# Patient Record
Sex: MALE | URBAN - METROPOLITAN AREA
[De-identification: names, ages, dates, MRNs, and addresses within clinical notes are randomized per-mention and may not be internally consistent; named-entity substitution may affect disease eponyms.]

---

## 2017-01-02 ENCOUNTER — TELEPHONE (OUTPATIENT)
Dept: FAMILY MEDICINE | Facility: CLINIC | Age: 27
End: 2017-01-02

## 2017-01-02 NOTE — TELEPHONE ENCOUNTER
Spoke to pt and scheduled him for Thursday with JF per his request.  Currently out of town and did not want OV with another provider tomorrow or Wednesday.  Saloni Campbell RN

## 2017-01-02 NOTE — TELEPHONE ENCOUNTER
Reason for call:  Patient reporting a symptom    Symptom or request: Strep Throat 11/29 went to [facility] and was given ABX and 4 days after he finished the Abx   he started getting symptoms again and went in around 12/15 and had strep again  The patient was then put on another ABX after he finishde this Antibiotic then the patients throat started hurting again he did a Zip-nosis  And the test was negative. The patient said both other time it came on hard and fast but this time it was not so hard but his tonsils are swollen   Like the size of Golf balls  And the [facility] told him to follow up with PCP clinic      Duration (how long have symptoms been present): for a month    Have you been treated for this before? Yes    Additional comments: please call the patient to discuss what he should do next    Phone Number patient can be reached at:  Home number on file [phone] (home)    Best Time:   anytime    Can we leave a detailed message on this number:  YES    Call taken on 1/2/2017 at 11:58 AM by Olamide Simmons

## 2017-03-17 ENCOUNTER — VIRTUAL VISIT (OUTPATIENT)
Dept: FAMILY MEDICINE | Facility: OTHER | Age: 27
End: 2017-03-17

## 2017-03-17 NOTE — PROGRESS NOTES
Date:   Clinician: Aron Rosen  Clinician NPI: [id number]  Patient: Jordan Tovar  Patient : 1990  Patient Address: [address] [place] [id number]  Patient Phone: [phone]  Visit Protocol: URI  Patient Summary:  Jordan is a 26 year old ( : 1990 ) male who initiated a Zip for evaluation of sore throat.      His symptoms started gradually 3-6 days ago and consist of ear pain, malaise, fever, loss of appetite, dysphagia, rhinitis, post-nasal drainage, sore throat, and hoarse voice.   He denies myalgias, chest pain, facial pain or pressure, cough, itchy eyes, chills, nausea, vomiting, dyspnea, nasal congestion, headache, and petechial or purpuric rash. He denies a history of facial surgery.   His minimal nasal secretions are clear.   He has a moderately painful sore throat. When Will swallows liquids or saliva, he experiences severe pain. The patient denies having white spots on the tonsils similar to a sample strep throat image provided. He might have been exposed to Strep. When asked to feel his neck he reported enlarged lymph nodes. Will noted that the enlarged neck lymph nodes developed AFTER the onset of upper respiratory symptoms. He cannot tell if axillary lymphadenopathy is present.   Regarding the ear pain, the patient denies experiencing pain when gently pulling on the earlobe, pain if the mouth is fully open or teeth are clenched, recent injury to the area around the ear, and tinnitus.   He reports having mild ear pain on the ear canal area of both ears for 2-4 days. The patient hears normally despite the ear pain.   In addition to the ear pain, Jordan also reports having the following symptoms:    A feeling of fullness in the ear as if it is clogged   Will denies having swelling, tenderness, and redness on his ear.   Additionally, he.   He has never had tympanostomy tube placement. In the last two weeks the patient has participated in swimming.    His highest  temperature was 99.7 degrees Fahrenheit. His current temperature is 98.4 degrees Fahrenheit. Jordan has had a fever for 1 - 2 days and used the oral method for measuring his temperature.   He has passed urine in the past 12 hours.   Jordan denies having COPD or other chronic lung disease.   Pulse: self-reported pulse rate as: 11 beats in 10 seconds.   Current Temperature (F): 98.4     Weight (in lbs): 165   Jordan does not smoke or use smokeless tobacco.   Additional information as reported by the patient (free text): My tonsils are huge.   MEDICATIONS:  Sertraline (Zoloft)  , ALLERGIES:  NKDA   Clinician Response:  Dear Jordan,  Your ZipTicket results show that you have a bacterial infection called strep throat. I am prescribing Penicillin 500mg to treat this infection. Take one tablet twice a day for ten days, there is no refill with this prescription. Be sure to take all of the medication exactly as prescribed.   You will need to obtain a new toothbrush after taking your antibiotics for 24 hours. Do not return to work, school, or  until you have taken the antibiotic medication for a full 24 hours. Do not share eating utensils and avoid direct contact with others, such as kissing, until the infection has resolved.  Try the following to help with your pain and discomfort:     Use throat lozenges    Gargle with warm salt water (1/4 teaspoon of salt per 8 ounce glass of water).    Suck on frozen items such as Popsicles or ice cubes.    Take ibuprofen (such as Advil or store brand) or acetaminophen (Tylenol or store brand) for discomfort or fever     Call 911 or go to the emergency room if you suddenly develop a rash, are drooling or unable to swallow fluids, if you are having difficulty breathing, or feel that your throat is closing off.  Call your clinic right away if you have blood in the urine, increased blood pressure or swelling in any part of the body. These are signs of a rare but serious problem called  post-streptococcal glomerulonephritis, which can happen after a strep infection.  Follow up with your primary care clinician if your symptoms are not improving in 3-4 days.   Diagnosis: ZIP TICKET STREP  Diagnosis ICD: J02.0  ZipTicket Results: Strep Test - Positive: group a streptococcal antigen detected by immunoassay  Chrisket Secondary Results: N/A  Prescription: penicillin V potassium 500mg 20 tablets, 10 days supply. Take one tablet by mouth two times a day for 10 days. Refills: 0, Refill as needed: no, Allow substitutions: yes

## 2018-03-08 ENCOUNTER — OFFICE VISIT (OUTPATIENT)
Dept: FAMILY MEDICINE | Facility: CLINIC | Age: 28
End: 2018-03-08
Payer: COMMERCIAL

## 2018-03-08 VITALS
TEMPERATURE: 97.3 F | OXYGEN SATURATION: 99 % | RESPIRATION RATE: 16 BRPM | HEART RATE: 65 BPM | HEIGHT: 70 IN | WEIGHT: 182.2 LBS | BODY MASS INDEX: 26.08 KG/M2 | SYSTOLIC BLOOD PRESSURE: 100 MMHG | DIASTOLIC BLOOD PRESSURE: 58 MMHG

## 2018-03-08 DIAGNOSIS — Z00.00 ROUTINE HISTORY AND PHYSICAL EXAMINATION OF ADULT: Primary | ICD-10-CM

## 2018-03-08 DIAGNOSIS — B07.0 PLANTAR WARTS: ICD-10-CM

## 2018-03-08 DIAGNOSIS — F52.4 PREMATURE EJACULATION: ICD-10-CM

## 2018-03-08 PROCEDURE — 99395 PREV VISIT EST AGE 18-39: CPT | Mod: 25 | Performed by: FAMILY MEDICINE

## 2018-03-08 PROCEDURE — 17110 DESTRUCTION B9 LES UP TO 14: CPT | Performed by: FAMILY MEDICINE

## 2018-03-08 RX ORDER — SERTRALINE HYDROCHLORIDE 100 MG/1
100 TABLET, FILM COATED ORAL DAILY
Qty: 90 TABLET | Refills: 3 | Status: SHIPPED | OUTPATIENT
Start: 2018-03-08 | End: 2019-03-14

## 2018-03-08 RX ORDER — SERTRALINE HYDROCHLORIDE 100 MG/1
100 TABLET, FILM COATED ORAL DAILY
Qty: 90 TABLET | Refills: 3 | Status: SHIPPED | OUTPATIENT
Start: 2018-03-08 | End: 2018-03-08

## 2018-03-08 NOTE — NURSING NOTE
"Chief Complaint   Patient presents with     Physical     Initial /58  Pulse 65  Temp 97.3  F (36.3  C) (Oral)  Resp 16  Ht 5' 10.1\" (1.781 m)  Wt 182 lb 3.2 oz (82.6 kg)  SpO2 99%  BMI 26.07 kg/m2 Estimated body mass index is 26.07 kg/(m^2) as calculated from the following:    Height as of this encounter: 5' 10.1\" (1.781 m).    Weight as of this encounter: 182 lb 3.2 oz (82.6 kg).  BP completed using cuff size: large.L  arm       Health Maintenance that is potentially due pending provider review:   NONE           Melony Le CMA     "

## 2018-03-08 NOTE — MR AVS SNAPSHOT
After Visit Summary   3/8/2018    Grady Tovar    MRN: [id number]           Patient Information     Date Of Birth          1990        Visit Information        Provider Department      3/8/2018 11:15 AM Mike Shipman MD [facility]        Today's Diagnoses     Routine history and physical examination of adult    -  1    Premature ejaculation        Plantar warts          Care Instructions      Preventive Health Recommendations  Male Ages 26 - 39    Yearly exam:             See your health care provider every year in order to  o   Review health changes.   o   Discuss preventive care.    o   Review your medicines if your doctor has prescribed any.    You should be tested each year for STDs (sexually transmitted diseases), if you re at risk.     After age 35, talk to your provider about cholesterol testing. If you are at risk for heart disease, have your cholesterol tested at least every 5 years.     If you are at risk for diabetes, you should have a diabetes test (fasting glucose).  Shots: Get a flu shot each year. Get a tetanus shot every 10 years.     Nutrition:    Eat at least 5 servings of fruits and vegetables daily.     Eat whole-grain bread, whole-wheat pasta and brown rice instead of white grains and rice.     Talk to your provider about Calcium and Vitamin D.     Lifestyle    Exercise for at least 150 minutes a week (30 minutes a day, 5 days a week). This will help you control your weight and prevent disease.     Limit alcohol to one drink per day.     No smoking.     Wear sunscreen to prevent skin cancer.     See your dentist every six months for an exam and cleaning.             Follow-ups after your visit        Follow-up notes from your care team     Return in about 1 year (around 3/8/2019), or if symptoms worsen or fail to improve.      Who to contact     If you have questions or need follow up information about today's clinic visit or your schedule please  "contact [facility] directly at [phone].  Normal or non-critical lab and imaging results will be communicated to you by MyChart, letter or phone within 4 business days after the clinic has received the results. If you do not hear from us within 7 days, please contact the clinic through efw-suhlhart or phone. If you have a critical or abnormal lab result, we will notify you by phone as soon as possible.  Submit refill requests through Meitu or call your pharmacy and they will forward the refill request to us. Please allow 3 business days for your refill to be completed.          Additional Information About Your Visit        efw-suhlharKwaab Information     Meitu gives you secure access to your electronic health record. If you see a primary care provider, you can also send messages to your care team and make appointments. If you have questions, please call your primary care clinic.  If you do not have a primary care provider, please call [phone] and they will assist you.        Care EveryWhere ID     This is your Care EveryWhere ID. This could be used by other organizations to access your [facility] medical records  [id number]        Your Vitals Were     Pulse Temperature Respirations Height Pulse Oximetry BMI (Body Mass Index)    65 97.3  F (36.3  C) (Oral) 16 5' 10.1\" (1.781 m) 99% 26.07 kg/m2       Blood Pressure from Last 3 Encounters:   03/08/18 100/58   07/15/16 100/60   05/29/14 120/74    Weight from Last 3 Encounters:   03/08/18 182 lb 3.2 oz (82.6 kg)   07/15/16 166 lb (75.3 kg)   05/29/14 167 lb (75.8 kg)              We Performed the Following     DESTRUCT BENIGN LESION, 15 OR MORE          Today's Medication Changes          These changes are accurate as of 3/8/18  1:29 PM.  If you have any questions, ask your nurse or doctor.               These medicines have changed or have updated prescriptions.        Dose/Directions    * ZOLOFT PO   This may have changed:  Another medication with the same " name was added. Make sure you understand how and when to take each.   Changed by:  Mike Shipman MD        Dose:  100 mg   Take 100 mg by mouth daily   Refills:  0       * sertraline 100 MG tablet   Commonly known as:  ZOLOFT   This may have changed:  You were already taking a medication with the same name, and this prescription was added. Make sure you understand how and when to take each.   Used for:  Premature ejaculation   Changed by:  Mike Shipman MD        Dose:  100 mg   Take 1 tablet (100 mg) by mouth daily (profile don't fill)   Quantity:  90 tablet   Refills:  3       * Notice:  This list has 2 medication(s) that are the same as other medications prescribed for you. Read the directions carefully, and ask your doctor or other care provider to review them with you.         Where to get your medicines      These medications were sent to [facility] - [place], [place] - [address] AT [address] to [address], [facility] [address]     Phone:  [phone]     sertraline 100 MG tablet                Primary Care Provider Office Phone # Fax #    Mike Marcel Shipman [phone] [address] [facility] [address]        Equal Access to Services     MEME FARMER AH: Hadii claudia contreras hadasho Soomaali, waaxda luqadaha, qaybta kaalmada adeegyada, waxay jamey cardoso. So [facility] [phone].    ATENCIÓN: Si habla español, tiene a garcía disposición servicios gratuitos de asistencia lingüística. Llame al [phone].    We comply with applicable federal civil rights laws and [place] laws. We do not discriminate on the basis of race, color, national origin, age, disability, sex, sexual orientation, or gender identity.            Thank you!     Thank you for choosing [facility]  for your care. Our goal is always to provide you with excellent care. Hearing back from our patients is one way  we can continue to improve our services. Please take a few minutes to complete the written survey that you may receive in the mail after your visit with us. Thank you!             Your Updated Medication List - Protect others around you: Learn how to safely use, store and throw away your medicines at www.disposemymeds.org.          This list is accurate as of 3/8/18  1:29 PM.  Always use your most recent med list.                   Brand Name Dispense Instructions for use Diagnosis    * ZOLOFT PO      Take 100 mg by mouth daily        * sertraline 100 MG tablet    ZOLOFT    90 tablet    Take 1 tablet (100 mg) by mouth daily (profile don't fill)    Premature ejaculation       * Notice:  This list has 2 medication(s) that are the same as other medications prescribed for you. Read the directions carefully, and ask your doctor or other care provider to review them with you.

## 2018-03-08 NOTE — PROGRESS NOTES
SUBJECTIVE:   CC: Grady Tovar is an 27 year old male who presents for preventative health visit.     Physical   Annual:     Getting at least 3 servings of Calcium per day::  Yes    Bi-annual eye exam::  Yes    Dental care twice a year::  Yes    Sleep apnea or symptoms of sleep apnea::  None    Diet::  Regular (no restrictions)    Frequency of exercise::  2-3 days/week    Duration of exercise::  15-30 minutes    Taking medications regularly::  Yes    Medication side effects::  Not applicable    Additional concerns today::  No                On sertreline for premature ejaculation  This is stable        Today's PHQ-2 Score:   PHQ-2 ( 1999 Pfizer) 3/8/2018   Q1: Little interest or pleasure in doing things 0   Q2: Feeling down, depressed or hopeless 0   PHQ-2 Score 0   Q1: Little interest or pleasure in doing things Not at all   Q2: Feeling down, depressed or hopeless Not at all   PHQ-2 Score 0       Abuse: Current or Past(Physical, Sexual or Emotional)- No  Do you feel safe in your environment - Yes    Social History   Substance Use Topics     Smoking status: Never Smoker     Smokeless tobacco: Never Used     Alcohol use Yes     No flowsheet data found.    Last PSA: No results found for: PSA    Reviewed orders with patient. Reviewed health maintenance and updated orders accordingly - Yes  Labs reviewed in EPIC  BP Readings from Last 3 Encounters:   03/08/18 100/58   07/15/16 100/60   05/29/14 120/74    Wt Readings from Last 3 Encounters:   03/08/18 182 lb 3.2 oz (82.6 kg)   07/15/16 166 lb (75.3 kg)   05/29/14 167 lb (75.8 kg)                  Patient Active Problem List   Diagnosis     CARDIOVASCULAR SCREENING; LDL GOAL LESS THAN 160     History reviewed. No pertinent surgical history.    Social History   Substance Use Topics     Smoking status: Never Smoker     Smokeless tobacco: Never Used     Alcohol use Yes     Family History   Problem Relation Age of Onset     C.A.D. Maternal Grandfather      Alzheimer  "Disease Maternal Grandfather      Family History Negative Mother      Family History Negative Father          Current Outpatient Prescriptions   Medication Sig Dispense Refill     Sertraline HCl (ZOLOFT PO) Take 100 mg by mouth daily       sertraline (ZOLOFT) 100 MG tablet Take 1 tablet (100 mg) by mouth daily (profile don't fill) 90 tablet 3     [DISCONTINUED] sertraline (ZOLOFT) 100 MG tablet Take 1 tablet (100 mg) by mouth daily 90 tablet 3     No Known Allergies    Reviewed and updated as needed this visit by clinical staff  Tobacco  Allergies  Meds  Med Hx  Surg Hx  Fam Hx         Reviewed and updated as needed this visit by Provider            Review of Systems    Constitutional: no recent illness, no fevers/sweats/chills  Eyes: No vision changes or eye irritation  Ears/Nose/Throat: No runny nose, sore throat or ear pain  CV: no palpitations, no chest pain, no lower extremity swelling.  Resp: no shortness of breath, wheezing, or cough.  GI: no nausea/vomiting/diarrhea, no black or bloody stools  : no burning or urgency with urination  Skin: no rash, warts on feet  Musculoskeletal: no joint pain, muscle pain, weakness  Psych: no depression, no concerns about anxiety  Neuro: no new headaches, dizziness, numbness, or tingling      OBJECTIVE:   /58  Pulse 65  Temp 97.3  F (36.3  C) (Oral)  Resp 16  Ht 5' 10.1\" (1.781 m)  Wt 182 lb 3.2 oz (82.6 kg)  SpO2 99%  BMI 26.07 kg/m2    Physical Exam    General Appearance: Pleasant, alert, in no acute respiratory distress.  Head Exam: Normal. Normocephalic, atraumatic. No sinus tenderness.  Eye Exam: Normal external eye, conjunctiva, lids. JOVI.  Ear Exam: Normal auditory canals and external ears. Non-tender.  Left TM-normal. Right TM-normal.  OroPharynx Exam: Dental hygiene adequate. Normal buccal mucosa. Normal pharynx.  Neck Exam: Supple, no masses or enlarged, tender nodes.  Thyroid Exam: No nodules or enlargement or tenderness.  Chest/Respiratory " "Exam: Normal, comfortable, easy respirations. Chest wall normal. Lungs are clear to auscultation. No wheezing, crackles, or rhonchi.  Cardiovascular Exam: Regular rate and rhythm. No murmur, gallop, or rubs. No pedal edema.  Gastrointestinal Exam: Soft, non-tender, no masses or organomegaly.  Musculoskeletal Exam: Back is non-tender, full ROM of upper and lower extremities.  Skin: no rash, warm and dry.  Warts as below  Neurologic Exam: Nonfocal, no tremor. Normal gait.  Psychiatric Exam: Alert - appropriate, normal affect      ASSESSMENT/PLAN:   1. Routine history and physical examination of adult    2. Premature ejaculation  Stable refill  - sertraline (ZOLOFT) 100 MG tablet; Take 1 tablet (100 mg) by mouth daily (profile don't fill)  Dispense: 90 tablet; Refill: 3    3. Plantar warts    he also has noted some skin lesions on his feet he wants checked at this visit. Observations by patient are increasing number of lesions. Exam of skin shows warts on the ball the foot and the great toe. Patient is reassured these are benign lesions, discussed possible treatments, patient elects cryotherapy    Liquid nitrogen was applied for 5-10 seconds x3  to the skin lesions and the expected blistering or scabbing reaction explained. Do not pick at the areas. Patient reminded to expect hypopigmented scars from the procedure. Return discussed treatment with salicylic acid or return in 3 weeks      - DESTRUCT BENIGN LESION, 15 OR MORE         COUNSELING:   Reviewed preventive health counseling, as reflected in patient instructions       Regular exercise       Healthy diet/nutrition         reports that he has never smoked. He has never used smokeless tobacco.    Estimated body mass index is 26.07 kg/(m^2) as calculated from the following:    Height as of this encounter: 5' 10.1\" (1.781 m).    Weight as of this encounter: 182 lb 3.2 oz (82.6 kg).       Counseling Resources:  ATP IV Guidelines  Pooled Cohorts Equation " Calculator  FRAX Risk Assessment  ICSI Preventive Guidelines  Dietary Guidelines for Americans, 2010  USDA's MyPlate  ASA Prophylaxis  Lung CA Screening    Mike Marcel Shipman MD  [facility]. [facility]'s HospitalAnswers for HPI/ROS submitted by the patient on 3/8/2018   PHQ-2 Score: 0

## 2019-03-14 DIAGNOSIS — F52.4 PREMATURE EJACULATION: ICD-10-CM

## 2019-03-15 NOTE — TELEPHONE ENCOUNTER
"Due for physical  Last 3/8/18  Takes for premature ejaculation    Mychart message sent to patient asking him to schedule appointment.  Janis Berry RN    Requested Prescriptions   Pending Prescriptions Disp Refills     sertraline (ZOLOFT) 100 MG tablet [Pharmacy Med Name: SERTRALINE TAB 100MG] 30 tablet 0     Sig: TAKE 1 TABLET DAILY    SSRIs Protocol Failed - 3/14/2019  5:57 AM       Failed - Recent (12 mo) or future (30 days) visit within the authorizing provider's specialty    Patient had office visit in the last 12 months or has a visit in the next 30 days with authorizing provider or within the authorizing provider's specialty.  See \"Patient Info\" tab in inbasket, or \"Choose Columns\" in Meds & Orders section of the refill encounter.             Passed - Medication is active on med list       Passed - Patient is age 18 or older          "

## 2019-03-18 RX ORDER — SERTRALINE HYDROCHLORIDE 100 MG/1
TABLET, FILM COATED ORAL
Qty: 30 TABLET | Refills: 0 | Status: SHIPPED | OUTPATIENT
Start: 2019-03-18 | End: 2019-03-19

## 2019-03-18 NOTE — TELEPHONE ENCOUNTER
Routing refill request to provider for review/approval because:  Drug not on the [facility] refill protocol for dx of premature ejaculation   Patient has future OV with you     Next 5 appointments (look out 90 days)    Mar 19, 2019  3:00 PM CDT  MyChart Physical Adult with Mikewesley Shipman MD  [facility] ([facility]) [address]  [facility] [address] [phone]        Thanks,  Nicole BAKER RN

## 2019-03-19 ENCOUNTER — ANCILLARY PROCEDURE (OUTPATIENT)
Dept: GENERAL RADIOLOGY | Facility: CLINIC | Age: 29
End: 2019-03-19
Attending: FAMILY MEDICINE
Payer: COMMERCIAL

## 2019-03-19 ENCOUNTER — OFFICE VISIT (OUTPATIENT)
Dept: FAMILY MEDICINE | Facility: CLINIC | Age: 29
End: 2019-03-19
Payer: COMMERCIAL

## 2019-03-19 VITALS
HEART RATE: 63 BPM | TEMPERATURE: 97.4 F | HEIGHT: 70 IN | WEIGHT: 192.5 LBS | BODY MASS INDEX: 27.56 KG/M2 | OXYGEN SATURATION: 98 % | RESPIRATION RATE: 16 BRPM | SYSTOLIC BLOOD PRESSURE: 103 MMHG | DIASTOLIC BLOOD PRESSURE: 77 MMHG

## 2019-03-19 DIAGNOSIS — Z00.00 ROUTINE HISTORY AND PHYSICAL EXAMINATION OF ADULT: Primary | ICD-10-CM

## 2019-03-19 DIAGNOSIS — S99.922A TOE INJURY, LEFT, INITIAL ENCOUNTER: ICD-10-CM

## 2019-03-19 DIAGNOSIS — F52.4 PREMATURE EJACULATION: ICD-10-CM

## 2019-03-19 PROCEDURE — 73660 X-RAY EXAM OF TOE(S): CPT | Mod: LT

## 2019-03-19 PROCEDURE — 99213 OFFICE O/P EST LOW 20 MIN: CPT | Mod: 25 | Performed by: FAMILY MEDICINE

## 2019-03-19 PROCEDURE — 99395 PREV VISIT EST AGE 18-39: CPT | Performed by: FAMILY MEDICINE

## 2019-03-19 RX ORDER — SERTRALINE HYDROCHLORIDE 100 MG/1
100 TABLET, FILM COATED ORAL DAILY
Qty: 90 TABLET | Refills: 3 | Status: SHIPPED | OUTPATIENT
Start: 2019-03-19 | End: 2020-01-20

## 2019-03-19 ASSESSMENT — PAIN SCALES - GENERAL: PAINLEVEL: NO PAIN (0)

## 2019-03-19 ASSESSMENT — MIFFLIN-ST. JEOR: SCORE: 1846.01

## 2019-03-19 NOTE — PROGRESS NOTES
SUBJECTIVE:   CC: Grady Tovar is an 28 year old male who presents for preventative health visit.     Physical   Annual:     Getting at least 3 servings of Calcium per day:  Yes    Bi-annual eye exam:  Yes    Dental care twice a year:  Yes    Sleep apnea or symptoms of sleep apnea:  None    Diet:  Regular (no restrictions)    Frequency of exercise:  2-3 days/week    Duration of exercise:  30-45 minutes    Taking medications regularly:  Yes    Medication side effects:  None    Additional concerns today:  No    PHQ-2 Total Score: 0      in addition to health maintenance patient would like to discuss the following problem:    Musculoskeletal problem/pain      Duration: 2-3 months    Description  Location:  Left big toe    Intensity:  moderate    Accompanying signs and symptoms: just pain    History  Previous similar problem: no   Previous evaluation:  none    Precipitating or alleviating factors:  Trauma or overuse: YES-  Got stepped on when playingsoccer  Aggravating factors include: walking and  Apply pressure    Therapies tried and outcome: nothing    Pushing off still painful    Problem pertinent exam significant for mild tenderness of the proximal MTP  X-ray obtained and reviewed with the patient today  No sign of significant bony injury or fracture    We discussed home therapies including over-the-counter medications    Follow-up as needed if this persists    Today's PHQ-2 Score:   PHQ-2 ( 1999 [employer]) 3/19/2019   Q1: Little interest or pleasure in doing things 0   Q2: Feeling down, depressed or hopeless 0   PHQ-2 Score 0   Q1: Little interest or pleasure in doing things Not at all   Q2: Feeling down, depressed or hopeless Not at all   PHQ-2 Score 0       Abuse: Current or Past(Physical, Sexual or Emotional)- No  Do you feel safe in your environment? Yes    Social History     Tobacco Use     Smoking status: Never Smoker     Smokeless tobacco: Never Used   Substance Use Topics     Alcohol use: Yes     Alcohol  Use 3/19/2019   If you drink alcohol do you typically have greater than 3 drinks per day OR greater than 7 drinks per week? No       Last PSA: No results found for: PSA    Reviewed orders with patient. Reviewed health maintenance and updated orders accordingly - Yes  Labs reviewed in EPIC  BP Readings from Last 3 Encounters:   03/19/19 103/77   03/08/18 100/58   07/15/16 100/60    Wt Readings from Last 3 Encounters:   03/19/19 87.3 kg (192 lb 8 oz)   03/08/18 82.6 kg (182 lb 3.2 oz)   07/15/16 75.3 kg (166 lb)                  Patient Active Problem List   Diagnosis     CARDIOVASCULAR SCREENING; LDL GOAL LESS THAN 160     No past surgical history on file.    Social History     Tobacco Use     Smoking status: Never Smoker     Smokeless tobacco: Never Used   Substance Use Topics     Alcohol use: Yes     Family History   Problem Relation Age of Onset     C.A.D. Maternal Grandfather      Alzheimer Disease Maternal Grandfather      Family History Negative Mother      Family History Negative Father          Current Outpatient Medications   Medication Sig Dispense Refill     sertraline (ZOLOFT) 100 MG tablet Take 1 tablet (100 mg) by mouth daily 90 tablet 3     No Known Allergies    Reviewed and updated as needed this visit by clinical staff  Tobacco  Meds         Reviewed and updated as needed this visit by Provider            Review of Systems  CONSTITUTIONAL: NEGATIVE for fever, chills, change in weight  INTEGUMENTARY/SKIN: NEGATIVE for worrisome rashes, moles or lesions  EYES: NEGATIVE for vision changes or irritation  ENT: NEGATIVE for ear, mouth and throat problems  RESP: NEGATIVE for significant cough or SOB  CV: NEGATIVE for chest pain, palpitations or peripheral edema  GI: NEGATIVE for nausea, abdominal pain, heartburn, or change in bowel habits   male: negative for dysuria, hematuria, decreased urinary stream, erectile dysfunction, urethral discharge  MUSCULOSKELETAL: NEGATIVE for significant arthralgias or  "myalgia  Except as noted above  NEURO: NEGATIVE for weakness, dizziness or paresthesias  PSYCHIATRIC: NEGATIVE for changes in mood or affect    OBJECTIVE:   /77 (BP Location: Left arm, Patient Position: Sitting, Cuff Size: Adult Large)   Pulse 63   Temp 97.4  F (36.3  C) (Oral)   Resp 16   Ht 1.781 m (5' 10.1\")   Wt 87.3 kg (192 lb 8 oz)   SpO2 98%   BMI 27.54 kg/m      Physical Exam    General Appearance: Pleasant, alert, in no acute respiratory distress.  Head Exam: Normal. Normocephalic, atraumatic. No sinus tenderness.  Eye Exam: Normal external eye, conjunctiva, lids. JOVI.  Ear Exam: Normal auditory canals and external ears. Non-tender.  Left TM-normal. Right TM-normal.  OroPharynx Exam: Dental hygiene adequate. Normal buccal mucosa. Normal pharynx.  Neck Exam: Supple, no masses or enlarged, tender nodes.  Thyroid Exam: No nodules or enlargement or tenderness.  Chest/Respiratory Exam: Normal, comfortable, easy respirations. Chest wall normal. Lungs are clear to auscultation. No wheezing, crackles, or rhonchi.  Cardiovascular Exam: Regular rate and rhythm. No murmur, gallop, or rubs. No pedal edema.  Gastrointestinal Exam: Soft, non-tender, no masses or organomegaly.  Musculoskeletal Exam: Back is non-tender, full ROM of upper and lower extremities.  Except as noted above  Skin: no rash, warm and dry.    Neurologic Exam: Nonfocal, no tremor. Normal gait.  Psychiatric Exam: Alert - appropriate, normal affect        ASSESSMENT/PLAN:       ICD-10-CM    1. Routine history and physical examination of adult Z00.00    2. Premature ejaculation F52.4 sertraline (ZOLOFT) 100 MG tablet   3. Toe injury, left, initial encounter S99.922A XR Toe Left G/E 2 Views       COUNSELING:   Reviewed preventive health counseling, as reflected in patient instructions       Regular exercise       Healthy diet/nutrition       Safe sex practices/STD prevention    BP Readings from Last 1 Encounters:   03/19/19 103/77 " "    Estimated body mass index is 27.54 kg/m  as calculated from the following:    Height as of this encounter: 1.781 m (5' 10.1\").    Weight as of this encounter: 87.3 kg (192 lb 8 oz).       reports that  has never smoked. he has never used smokeless tobacco.      Counseling Resources:  ATP IV Guidelines  Pooled Cohorts Equation Calculator  FRAX Risk Assessment  ICSI Preventive Guidelines  Dietary Guidelines for Americans, 2010  USDA's MyPlate  ASA Prophylaxis  Lung CA Screening    Mike Marcel Shipman MD  [facility]  "

## 2019-03-19 NOTE — NURSING NOTE
"Chief Complaint   Patient presents with     Physical     /77 (BP Location: Left arm, Patient Position: Sitting, Cuff Size: Adult Large)   Pulse 63   Temp 97.4  F (36.3  C) (Oral)   Resp 16   Ht 1.781 m (5' 10.1\")   Wt 87.3 kg (192 lb 8 oz)   SpO2 98%   BMI 27.54 kg/m   Estimated body mass index is 27.54 kg/m  as calculated from the following:    Height as of this encounter: 1.781 m (5' 10.1\").    Weight as of this encounter: 87.3 kg (192 lb 8 oz).  bp completed using cuff size: regular      Health Maintenance addressed:  NONE    n/a              "

## 2019-03-29 ENCOUNTER — TRANSFERRED RECORDS (OUTPATIENT)
Dept: HEALTH INFORMATION MANAGEMENT | Facility: CLINIC | Age: 29
End: 2019-03-29

## 2019-04-13 DIAGNOSIS — F52.4 PREMATURE EJACULATION: ICD-10-CM

## 2019-04-15 RX ORDER — SERTRALINE HYDROCHLORIDE 100 MG/1
TABLET, FILM COATED ORAL
Start: 2019-04-15

## 2019-04-15 NOTE — TELEPHONE ENCOUNTER
"Too soon.  Rx sent 3/19/19 for #90 with 3 refills.  Janis Berry RN    Requested Prescriptions   Refused Prescriptions Disp Refills     sertraline (ZOLOFT) 100 MG tablet [Pharmacy Med Name: SERTRALINE TAB 100MG]       Sig: TAKE 1 TABLET DAILY       SSRIs Protocol Passed - 4/13/2019  4:27 AM        Passed - Recent (12 mo) or future (30 days) visit within the authorizing provider's specialty     Patient had office visit in the last 12 months or has a visit in the next 30 days with authorizing provider or within the authorizing provider's specialty.  See \"Patient Info\" tab in inbasket, or \"Choose Columns\" in Meds & Orders section of the refill encounter.              Passed - Medication is active on med list        Passed - Patient is age 18 or older          "

## 2020-01-20 ENCOUNTER — MYC REFILL (OUTPATIENT)
Dept: FAMILY MEDICINE | Facility: CLINIC | Age: 30
End: 2020-01-20

## 2020-01-20 DIAGNOSIS — F52.4 PREMATURE EJACULATION: ICD-10-CM

## 2020-01-21 RX ORDER — SERTRALINE HYDROCHLORIDE 100 MG/1
100 TABLET, FILM COATED ORAL DAILY
Qty: 90 TABLET | Refills: 0 | Status: SHIPPED | OUTPATIENT
Start: 2020-01-21 | End: 2020-03-24

## 2020-01-21 NOTE — TELEPHONE ENCOUNTER
"Prescription approved per [facility] Refill Protocol.  Due for physical March 2020  Nicole BAKER RN    Last Written Prescription Date:  3/19/2019  Last Fill Quantity: 90,  # refills: 3   Last office visit: 3/19/2019 with prescribing provider:     Future Office Visit:    Requested Prescriptions   Pending Prescriptions Disp Refills     sertraline (ZOLOFT) 100 MG tablet 90 tablet 3     Sig: Take 1 tablet (100 mg) by mouth daily       SSRIs Protocol Passed - 1/20/2020  3:24 PM        Passed - Recent (12 mo) or future (30 days) visit within the authorizing provider's specialty     Patient has had an office visit with the authorizing provider or a provider within the authorizing providers department within the previous 12 mos or has a future within next 30 days. See \"Patient Info\" tab in inbasket, or \"Choose Columns\" in Meds & Orders section of the refill encounter.              Passed - Medication is active on med list        Passed - Patient is age 18 or older        "

## 2020-03-13 ENCOUNTER — MYC REFILL (OUTPATIENT)
Dept: FAMILY MEDICINE | Facility: CLINIC | Age: 30
End: 2020-03-13

## 2020-03-13 DIAGNOSIS — F52.4 PREMATURE EJACULATION: ICD-10-CM

## 2020-03-13 RX ORDER — SERTRALINE HYDROCHLORIDE 100 MG/1
100 TABLET, FILM COATED ORAL DAILY
Qty: 90 TABLET | Refills: 0 | Status: CANCELLED | OUTPATIENT
Start: 2020-03-13

## 2020-03-15 ENCOUNTER — HEALTH MAINTENANCE LETTER (OUTPATIENT)
Age: 30
End: 2020-03-15

## 2020-03-17 DIAGNOSIS — F52.4 PREMATURE EJACULATION: ICD-10-CM

## 2020-03-17 NOTE — TELEPHONE ENCOUNTER
Sertraline   Last Written Prescription Date:  1/21/20  Last Fill Quantity: 90,  # refills: 0   Last office visit: 3/19/2019 with prescribing provider:  yes   Future Office Visit:    Requested Prescriptions   Pending Prescriptions Disp Refills     sertraline (ZOLOFT) 100 MG tablet 90 tablet 0     Sig: Take 1 tablet (100 mg) by mouth daily       There is no refill protocol information for this order

## 2020-03-24 RX ORDER — SERTRALINE HYDROCHLORIDE 100 MG/1
100 TABLET, FILM COATED ORAL DAILY
Qty: 90 TABLET | Refills: 0 | Status: SHIPPED | OUTPATIENT
Start: 2020-03-24 | End: 2020-06-01

## 2020-03-24 NOTE — TELEPHONE ENCOUNTER
"Requested Prescriptions   Pending Prescriptions Disp Refills     sertraline (ZOLOFT) 100 MG tablet 90 tablet 0     Sig: Take 1 tablet (100 mg) by mouth daily       SSRIs Protocol Passed - 3/17/2020  9:45 AM        Passed - Recent (12 mo) or future (30 days) visit within the authorizing provider's specialty     Patient has had an office visit with the authorizing provider or a provider within the authorizing providers department within the previous 12 mos or has a future within next 30 days. See \"Patient Info\" tab in inbasket, or \"Choose Columns\" in Meds & Orders section of the refill encounter.              Passed - Medication is active on med list        Passed - Patient is age 18 or older             "

## 2020-03-24 NOTE — TELEPHONE ENCOUNTER
Routing refill request to provider for review/approval because:  Drug not on the [facility] refill protocol for dx of premature ejaculation   Pharmacy change also  Nicole BAKER RN

## 2020-05-29 DIAGNOSIS — F52.4 PREMATURE EJACULATION: ICD-10-CM

## 2020-05-29 NOTE — TELEPHONE ENCOUNTER
"sertraline (ZOLOFT) 100 MG tablet   Last Written Prescription Date:  03/24/2020  Last Fill Quantity: 90,  # refills: 0   Last office visit: 3/19/2019 with prescribing provider:  KENNEDY   Future Office Visit:  Nothing at this time scheduled.        Requested Prescriptions   Pending Prescriptions Disp Refills     sertraline (ZOLOFT) 100 MG tablet 90 tablet 0     Sig: Take 1 tablet (100 mg) by mouth daily       SSRIs Protocol Failed - 5/29/2020  1:44 PM        Failed - Recent (12 mo) or future (30 days) visit within the authorizing provider's specialty     Patient has had an office visit with the authorizing provider or a provider within the authorizing providers department within the previous 12 mos or has a future within next 30 days. See \"Patient Info\" tab in inbasket, or \"Choose Columns\" in Meds & Orders section of the refill encounter.              Passed - Medication is active on med list        Passed - Patient is age 18 or older           "

## 2020-06-01 RX ORDER — SERTRALINE HYDROCHLORIDE 100 MG/1
100 TABLET, FILM COATED ORAL DAILY
Qty: 30 TABLET | Refills: 0 | Status: SHIPPED | OUTPATIENT
Start: 2020-06-01 | End: 2020-06-11

## 2020-06-01 NOTE — TELEPHONE ENCOUNTER
JF,   Called pt - overdue for appt   Pt will callback to schedule   Ok for 30 day supply in the meantime?  Nicole BAKER RN

## 2020-06-10 DIAGNOSIS — F52.4 PREMATURE EJACULATION: ICD-10-CM

## 2020-06-11 RX ORDER — SERTRALINE HYDROCHLORIDE 100 MG/1
TABLET, FILM COATED ORAL
Qty: 90 TABLET | Refills: 0 | Status: SHIPPED | OUTPATIENT
Start: 2020-06-11 | End: 2020-08-20

## 2020-06-11 NOTE — TELEPHONE ENCOUNTER
KENNEDY,   Called pt - due for virtual visit  Pt will call insurance to see if virtual visits covered  Has new job and new insurance  However, will also be moving out of state to [place]  Wondering if can just get short term refill until sees new PCP out there  Please advise  Thanks,  Nicole BAKER RN

## 2020-08-19 DIAGNOSIS — F52.4 PREMATURE EJACULATION: ICD-10-CM

## 2020-08-20 RX ORDER — SERTRALINE HYDROCHLORIDE 100 MG/1
TABLET, FILM COATED ORAL
Qty: 30 TABLET | Refills: 0 | Status: SHIPPED | OUTPATIENT
Start: 2020-08-20

## 2020-08-20 NOTE — TELEPHONE ENCOUNTER
Sent in 30 day supply, overdue for visit. Pt is aware and has been called twice. May have moved out of state.    Sameera Kaye RN

## 2021-01-10 ENCOUNTER — HEALTH MAINTENANCE LETTER (OUTPATIENT)
Age: 31
End: 2021-01-10

## 2021-10-23 ENCOUNTER — HEALTH MAINTENANCE LETTER (OUTPATIENT)
Age: 31
End: 2021-10-23

## 2022-02-12 ENCOUNTER — HEALTH MAINTENANCE LETTER (OUTPATIENT)
Age: 32
End: 2022-02-12

## 2022-10-10 ENCOUNTER — HEALTH MAINTENANCE LETTER (OUTPATIENT)
Age: 32
End: 2022-10-10

## 2023-02-18 ENCOUNTER — HEALTH MAINTENANCE LETTER (OUTPATIENT)
Age: 33
End: 2023-02-18

## 2024-03-16 ENCOUNTER — HEALTH MAINTENANCE LETTER (OUTPATIENT)
Age: 34
End: 2024-03-16